# Patient Record
Sex: MALE | Race: WHITE | NOT HISPANIC OR LATINO | URBAN - METROPOLITAN AREA
[De-identification: names, ages, dates, MRNs, and addresses within clinical notes are randomized per-mention and may not be internally consistent; named-entity substitution may affect disease eponyms.]

---

## 2023-07-31 LAB
ALANINE AMINOTRANSFERASE (SGPT) (U/L) IN SER/PLAS: 34 U/L (ref 10–52)
ALBUMIN (G/DL) IN SER/PLAS: 4.7 G/DL (ref 3.4–5)
ALKALINE PHOSPHATASE (U/L) IN SER/PLAS: 43 U/L (ref 33–120)
ANION GAP IN SER/PLAS: 12 MMOL/L (ref 10–20)
ASPARTATE AMINOTRANSFERASE (SGOT) (U/L) IN SER/PLAS: 29 U/L (ref 9–39)
BASOPHILS (10*3/UL) IN BLOOD BY AUTOMATED COUNT: 0.03 X10E9/L (ref 0–0.1)
BASOPHILS/100 LEUKOCYTES IN BLOOD BY AUTOMATED COUNT: 0.6 % (ref 0–2)
BILIRUBIN TOTAL (MG/DL) IN SER/PLAS: 0.9 MG/DL (ref 0–1.2)
CALCIUM (MG/DL) IN SER/PLAS: 9.6 MG/DL (ref 8.6–10.3)
CARBON DIOXIDE, TOTAL (MMOL/L) IN SER/PLAS: 28 MMOL/L (ref 21–32)
CHLORIDE (MMOL/L) IN SER/PLAS: 101 MMOL/L (ref 98–107)
CHOLESTEROL (MG/DL) IN SER/PLAS: 168 MG/DL (ref 0–199)
CHOLESTEROL IN HDL (MG/DL) IN SER/PLAS: 38.1 MG/DL
CHOLESTEROL/HDL RATIO: 4.4
CREATININE (MG/DL) IN SER/PLAS: 0.98 MG/DL (ref 0.5–1.3)
EOSINOPHILS (10*3/UL) IN BLOOD BY AUTOMATED COUNT: 0.09 X10E9/L (ref 0–0.7)
EOSINOPHILS/100 LEUKOCYTES IN BLOOD BY AUTOMATED COUNT: 1.8 % (ref 0–6)
ERYTHROCYTE DISTRIBUTION WIDTH (RATIO) BY AUTOMATED COUNT: 12.1 % (ref 11.5–14.5)
ERYTHROCYTE MEAN CORPUSCULAR HEMOGLOBIN CONCENTRATION (G/DL) BY AUTOMATED: 33 G/DL (ref 32–36)
ERYTHROCYTE MEAN CORPUSCULAR VOLUME (FL) BY AUTOMATED COUNT: 86 FL (ref 80–100)
ERYTHROCYTES (10*6/UL) IN BLOOD BY AUTOMATED COUNT: 4.87 X10E12/L (ref 4.5–5.9)
GFR MALE: >90 ML/MIN/1.73M2
GLUCOSE (MG/DL) IN SER/PLAS: 81 MG/DL (ref 74–99)
HEMATOCRIT (%) IN BLOOD BY AUTOMATED COUNT: 41.8 % (ref 41–52)
HEMOGLOBIN (G/DL) IN BLOOD: 13.8 G/DL (ref 13.5–17.5)
IMMATURE GRANULOCYTES/100 LEUKOCYTES IN BLOOD BY AUTOMATED COUNT: 0.2 % (ref 0–0.9)
LDL: 98 MG/DL (ref 0–99)
LEUKOCYTES (10*3/UL) IN BLOOD BY AUTOMATED COUNT: 5.1 X10E9/L (ref 4.4–11.3)
LYMPHOCYTES (10*3/UL) IN BLOOD BY AUTOMATED COUNT: 2.22 X10E9/L (ref 1.2–4.8)
LYMPHOCYTES/100 LEUKOCYTES IN BLOOD BY AUTOMATED COUNT: 43.7 % (ref 13–44)
MONOCYTES (10*3/UL) IN BLOOD BY AUTOMATED COUNT: 0.47 X10E9/L (ref 0.1–1)
MONOCYTES/100 LEUKOCYTES IN BLOOD BY AUTOMATED COUNT: 9.3 % (ref 2–10)
NEUTROPHILS (10*3/UL) IN BLOOD BY AUTOMATED COUNT: 2.26 X10E9/L (ref 1.2–7.7)
NEUTROPHILS/100 LEUKOCYTES IN BLOOD BY AUTOMATED COUNT: 44.4 % (ref 40–80)
PLATELETS (10*3/UL) IN BLOOD AUTOMATED COUNT: 254 X10E9/L (ref 150–450)
POTASSIUM (MMOL/L) IN SER/PLAS: 4.1 MMOL/L (ref 3.5–5.3)
PROTEIN TOTAL: 7.2 G/DL (ref 6.4–8.2)
SODIUM (MMOL/L) IN SER/PLAS: 137 MMOL/L (ref 136–145)
TRIGLYCERIDE (MG/DL) IN SER/PLAS: 161 MG/DL (ref 0–149)
UREA NITROGEN (MG/DL) IN SER/PLAS: 19 MG/DL (ref 6–23)
VLDL: 32 MG/DL (ref 0–40)

## 2023-09-27 PROBLEM — E78.01 HETEROZYGOUS FAMILIAL HYPERCHOLESTEROLEMIA: Status: ACTIVE | Noted: 2023-09-27

## 2023-09-27 PROBLEM — E66.3 OVERWEIGHT WITH BODY MASS INDEX (BMI) OF 26 TO 26.9 IN ADULT: Status: ACTIVE | Noted: 2023-09-27

## 2023-09-27 PROBLEM — R03.0 BLOOD PRESSURE ELEVATED WITHOUT HISTORY OF HTN: Status: ACTIVE | Noted: 2023-09-27

## 2023-09-27 PROBLEM — E78.5 HYPERLIPEMIA: Status: ACTIVE | Noted: 2023-09-27

## 2023-09-27 RX ORDER — EZETIMIBE 10 MG/1
10 TABLET ORAL DAILY
COMMUNITY
Start: 2011-09-25 | End: 2024-02-05 | Stop reason: SDUPTHER

## 2023-09-27 RX ORDER — ROSUVASTATIN CALCIUM 40 MG/1
40 TABLET, COATED ORAL DAILY
COMMUNITY
Start: 2019-02-25 | End: 2024-02-05 | Stop reason: SDUPTHER

## 2023-09-27 RX ORDER — ASPIRIN 81 MG/1
81 TABLET ORAL DAILY
COMMUNITY

## 2023-10-02 ENCOUNTER — CLINICAL SUPPORT (OUTPATIENT)
Dept: GENETICS | Facility: CLINIC | Age: 38
End: 2023-10-02
Payer: OTHER GOVERNMENT

## 2023-10-02 VITALS
DIASTOLIC BLOOD PRESSURE: 82 MMHG | WEIGHT: 177.5 LBS | RESPIRATION RATE: 20 BRPM | HEART RATE: 72 BPM | TEMPERATURE: 98 F | BODY MASS INDEX: 26.9 KG/M2 | HEIGHT: 68 IN | SYSTOLIC BLOOD PRESSURE: 145 MMHG

## 2023-10-02 DIAGNOSIS — E78.01 FAMILIAL HYPERCHOLESTEROLEMIA: ICD-10-CM

## 2023-10-02 PROCEDURE — 96040 PR MEDICAL GENETICS COUNSELING EACH 30 MINUTES: CPT | Performed by: GENETIC COUNSELOR, MS

## 2023-10-02 NOTE — Clinical Note
10/03/23    Florentin Simon DO  [address]  [facility] [address]      Dear Dr. Florentin Simon DO,    I am writing to confirm that your patient, Spencer Floyd  received care in my office on 10/03/23. I have enclosed a summary of the care provided to Spencer for your reference.    Please contact me with any questions you may have regarding the visit.    Sincerely,         Ernestina Fish, [facility]  [address] [facility] [facility] [address]  [facility] [id number]    CC: No Recipients

## 2023-10-03 NOTE — PROGRESS NOTES
"Subjective   Patient ID: Spencer Floyd is a 38 y.o. male who presents for Heterozygous familial hypercholesterol (npv).  He is interested in learning about genetic testing and familial cascade testing for his two children.    Spencer notes that his father had both a clinical and genetic diagnosis of familial hypercholesterolemia, with a genetic mutation in a \"lipid receptor gene\". He is unable to retrieve these results. Because of his father's diagnosis, Spencer had childhood lipid screening, nad reports that his total cholesterol was between 400-500 at age 14. He has been on lipid lowering medication since childhood, and is currently on maximal dose rosuvastatin plus ezetimibe and his most recent LDL cholesterol this month was 98 mg/dL. He does not have a personal history of cardiovascular disease and he leads an active lifestyle without any exertional chest pain, shortness of breath, palpitations, or syncope. He is a non-smoker and does not have any other significant cardiovascular risk factors although he does have elevated blood pressure but not hypertension based on his ambulatory blood pressure cuff at home.     Family History:  A three generation pedigree was collected, and was concerning for the following:    -Father with clinical and reportedly genetic diagnosis of familial hypercholesterolemia. Had his first MI in his early 40s, and his second early 60s. He had to have extensive bypass surgery. He passed away in his 60s.  -Two paternal aunts and a paternal uncle with high cholesterol, but reportedly only started in late adulthood.  -Paternal grandfather had several MI's, unsure at what ages,  in his 60s.  -Brother  at age 22 to suicide, but reportedly had normal lipid levels.    Spencer is of [place]  descent.  Consanguinity and Ashkenazi Anabaptism ancestry was denied.    The rest of the family history was negative for intellectual disability, autism, birth defects, multiple " miscarriages, early onset cancer or kidney concerns, and other hereditary conditions.    Assessment/Plan   Genetic Test Ordered: yes    Genetic Counseling:  Familial hypercholesterolemia (FH) is a genetic condition characterized by abnormally high concentrations of low density lipoprotein cholesterol (LDL-C) in the blood since birth, which predisposes affected individuals to premature coronary heart disease (CHD), stroke, and death. In addition to extreme hypercholesterolemia, affected individuals may present with vascular-related manifestations of CHD, such as angina, myocardial infarction, peripheral vascular disease, and stroke. Physical manifestations, including xanthomas (subcutaneous fat deposits), xanthelasmas (subcutaneous fat deposits around the eyes), and corneal arcus (whitish ring around the iris), may also be observed.     Individuals with FH have a significantly increased risk for CHD, estimated to be 20 times greater than that of the general population. If untreated, men with FH have a 50% risk and women with FH have a 30% risk of having a cardiovascular event by ages 50 and 60 years, respectively. The prevalence of FH is estimated to be 1:300 to 1:500 individuals in the general population, with an even higher prevalence in certain ethnic groups.     Heterozygous FH (HeFH) is typically inherited in an autosomal dominant manner. Almost all affected individuals inherit the familial mutation from an affected parent. In addition, all children of an individual with HeFH have a 50% chance of inheriting the mutation. Homozygous FH (HoFH) is rare (prevalence 1:1,000,000) and occurs when an individual inherits two deleterious mutations. Thus, if both parents have HeFH, each of their children would have a 25% risk of inheriting HoFH and a 50% risk of inheriting HeFH.    We reviewed options for testing, specifically discussing a comprehensive lipidemia panel through [facility] that would examine 25 genes associated  with hereditary lipidemias. The risks, benefits, and limitations of genetic testing was discussed.  Possible test results, including positive, negative, and VUSs, were reviewed.  After discussion, the patient elected to proceed with panel testing.    Results should be available in 3-4 weeks and will be called out to the patient.  Should a likely pathogenic or pathogenic mutation be identified, we will be able to offer family members targeted testing.  If no mutation is identified, lipid screening for all 1st-degree relatives will be recommended.    Ernestina Fish MS [facility]  Licensed Genetic Counselor  [facility]    Reviewed by Vanessa Lux MD  Clinical   [facility]    Total time spent on day of encounter: 32 minutes

## 2023-10-10 ENCOUNTER — OFFICE VISIT (OUTPATIENT)
Dept: PRIMARY CARE | Facility: CLINIC | Age: 38
End: 2023-10-10
Payer: OTHER GOVERNMENT

## 2023-10-10 VITALS
BODY MASS INDEX: 26.64 KG/M2 | HEART RATE: 79 BPM | DIASTOLIC BLOOD PRESSURE: 66 MMHG | SYSTOLIC BLOOD PRESSURE: 132 MMHG | TEMPERATURE: 97.8 F | HEIGHT: 68 IN | WEIGHT: 175.8 LBS

## 2023-10-10 DIAGNOSIS — R07.89 ATYPICAL CHEST PAIN: Primary | ICD-10-CM

## 2023-10-10 DIAGNOSIS — R03.0 BLOOD PRESSURE ELEVATED WITHOUT HISTORY OF HTN: ICD-10-CM

## 2023-10-10 DIAGNOSIS — E78.49 FAMILIAL HYPERLIPIDEMIA: ICD-10-CM

## 2023-10-10 DIAGNOSIS — E78.01 HETEROZYGOUS FAMILIAL HYPERCHOLESTEROLEMIA: ICD-10-CM

## 2023-10-10 DIAGNOSIS — E66.3 OVERWEIGHT WITH BODY MASS INDEX (BMI) OF 26 TO 26.9 IN ADULT: ICD-10-CM

## 2023-10-10 DIAGNOSIS — R07.89 OTHER CHEST PAIN: ICD-10-CM

## 2023-10-10 DIAGNOSIS — E78.01 FAMILIAL HYPERCHOLESTEROLEMIA: ICD-10-CM

## 2023-10-10 DIAGNOSIS — R93.1 ELEVATED CORONARY ARTERY CALCIUM SCORE: ICD-10-CM

## 2023-10-10 PROCEDURE — 93000 ELECTROCARDIOGRAM COMPLETE: CPT | Performed by: INTERNAL MEDICINE

## 2023-10-10 PROCEDURE — 3008F BODY MASS INDEX DOCD: CPT | Performed by: INTERNAL MEDICINE

## 2023-10-10 PROCEDURE — 1036F TOBACCO NON-USER: CPT | Performed by: INTERNAL MEDICINE

## 2023-10-10 PROCEDURE — 99213 OFFICE O/P EST LOW 20 MIN: CPT | Performed by: INTERNAL MEDICINE

## 2023-10-10 RX ORDER — EVOLOCUMAB 140 MG/ML
140 INJECTION, SOLUTION SUBCUTANEOUS
COMMUNITY
Start: 2023-10-05

## 2023-10-10 ASSESSMENT — ENCOUNTER SYMPTOMS
CHILLS: 0
FEVER: 0

## 2023-10-10 NOTE — PROGRESS NOTES
Subjective   Patient ID: Spencer Floyd is a 38 y.o. male who presents for Follow-up (Test results).    HPI patient presents to the office today to review recent CT calcium score test.  States he did see cardiology and was started on Repatha Which he recently started as well as aspirin.  Has been making lifestyle changes as well.  States that his CT calcium score was 179 puts him in the moderately increased risk category.  Specific cardiac score showed the left main with 124.16 and LAD with 55.24.  States that last 3 to 4 days he has had some chest pain more as a sharp type of pain discomfort where the sternum meets the ribs.  No injury or trauma.  He was concerned about this pain given what he knew about the calcium score test and thinks this made him more anxious.  Inquires about what symptoms to look out for for heart attack and symptoms of coronary artery disease.  He is not having any fever, chills, shortness of breath, nausea, vomiting, sweats, pain of the jaw arm or back.  States he does exercise runs about 3 miles a day and has not had any symptoms during those episodes.  He notices this chest discomfort more at rest.  He does have elevated coronary calcium score as previously discussed and also has had genetic tests for familial hypercholesterolemia given this runs in the family.  There is a family history of early coronary artery disease.  Patient has had no lightheadedness dizziness or syncope.  He has upcoming appointment with cardiology early next month.      Review of Systems   Constitutional:  Negative for chills and fever.   HENT:  Negative for sore throat.    Respiratory:  Negative for cough and shortness of breath.    Cardiovascular:  Positive for chest pain. Negative for palpitations.   Gastrointestinal:  Negative for abdominal pain, diarrhea, nausea and vomiting.   Genitourinary:  Negative for dysuria.   Musculoskeletal:  Negative for back pain.   Skin:  Negative for rash.   Neurological:   "Negative for dizziness, syncope and light-headedness.   Psychiatric/Behavioral:  The patient is nervous/anxious.         Objective   /66 (BP Location: Right arm, Patient Position: Sitting, BP Cuff Size: Adult)   Pulse 79   Temp 36.6 °C (97.8 °F)   Ht 1.727 m (5' 8\")   Wt 79.7 kg (175 lb 12.8 oz)   BMI 26.73 kg/m²     Physical Exam  Vitals and nursing note reviewed.   Constitutional:       Appearance: Normal appearance.   HENT:      Head: Normocephalic and atraumatic.      Mouth/Throat:      Mouth: Mucous membranes are moist.      Pharynx: Oropharynx is clear. No oropharyngeal exudate.   Eyes:      Extraocular Movements: Extraocular movements intact.      Pupils: Pupils are equal, round, and reactive to light.   Cardiovascular:      Rate and Rhythm: Normal rate and regular rhythm.      Heart sounds: Normal heart sounds. No murmur heard.  Pulmonary:      Effort: Pulmonary effort is normal. No respiratory distress.      Breath sounds: Normal breath sounds. No wheezing.   Abdominal:      Palpations: Abdomen is soft. There is no mass.      Tenderness: There is no abdominal tenderness.   Musculoskeletal:      Cervical back: Neck supple.   Skin:     General: Skin is warm and dry.   Neurological:      General: No focal deficit present.      Mental Status: He is alert and oriented to person, place, and time.   Psychiatric:         Mood and Affect: Mood normal.         Behavior: Behavior normal.         Assessment/Plan   Problem List Items Addressed This Visit             ICD-10-CM    Familial hyperlipidemia E78.49    Relevant Orders    Referral to Cardiology    Blood pressure elevated without history of HTN R03.0    Heterozygous familial hypercholesterolemia E78.01    Relevant Orders    Echocardiogram Stress Test    Hyperlipemia E78.5    Relevant Orders    Referral to Cardiology    Overweight with body mass index (BMI) of 26 to 26.9 in adult E66.3, Z68.26    Relevant Orders    Echocardiogram Stress Test    Other " chest pain R07.89    Relevant Orders    Echocardiogram Stress Test    Elevated coronary artery calcium score R93.1    Relevant Orders    Echocardiogram Stress Test    Atypical chest pain - Primary R07.89    Relevant Orders    ECG 12 Lead    Referral to Cardiology     Atypical chest pain: We have ordered EKG in the office today did not show any acute injury pattern.  EKG showed sinus bradycardia with sinus arrhythmia, 50 bpm, CO interval 170 ms, QRS duration 106 ms, QTc 402 ms, incomplete right bundle branch block, normal R wave progression, nonspecific ST-T wave changes.  Given his cardiac risk factors and elevated coronary artery calcium score and family history of ordered a stress echocardiogram for further evaluation.  Suspect that his chest discomfort is more of a costochondritis based upon the description.  He may take over-the-counter ibuprofen for the next week.    Elevated coronary artery calcium score: Has appointment with cardiology to discuss.    Hyperlipidemia: Patient taking Repatha currently has had testing for familial hypercholesterolemia genetic testing that has not returned.    Blood pressure elevated without history of hypertension: We will need to monitor blood pressure carefully is making dietary and lifestyle changes

## 2023-10-13 ASSESSMENT — ENCOUNTER SYMPTOMS
SORE THROAT: 0
DIZZINESS: 0
COUGH: 0
DYSURIA: 0
PALPITATIONS: 0
BACK PAIN: 0
DIARRHEA: 0
NAUSEA: 0
SHORTNESS OF BREATH: 0
NERVOUS/ANXIOUS: 1
ABDOMINAL PAIN: 0
LIGHT-HEADEDNESS: 0
VOMITING: 0

## 2023-10-26 ENCOUNTER — TELEPHONE (OUTPATIENT)
Dept: GENETICS | Facility: CLINIC | Age: 38
End: 2023-10-26
Payer: OTHER GOVERNMENT

## 2023-10-30 ENCOUNTER — HOSPITAL ENCOUNTER (OUTPATIENT)
Dept: CARDIOLOGY | Facility: CLINIC | Age: 38
Discharge: HOME | End: 2023-10-30
Payer: OTHER GOVERNMENT

## 2023-10-30 DIAGNOSIS — R07.89 OTHER CHEST PAIN: ICD-10-CM

## 2023-10-30 DIAGNOSIS — E66.3 OVERWEIGHT WITH BODY MASS INDEX (BMI) OF 26 TO 26.9 IN ADULT: ICD-10-CM

## 2023-10-30 DIAGNOSIS — E78.01 HETEROZYGOUS FAMILIAL HYPERCHOLESTEROLEMIA: ICD-10-CM

## 2023-10-30 DIAGNOSIS — R93.1 ELEVATED CORONARY ARTERY CALCIUM SCORE: ICD-10-CM

## 2023-10-30 PROCEDURE — 93016 CV STRESS TEST SUPVJ ONLY: CPT | Performed by: INTERNAL MEDICINE

## 2023-10-30 PROCEDURE — 93350 STRESS TTE ONLY: CPT

## 2023-10-30 PROCEDURE — 93350 STRESS TTE ONLY: CPT | Performed by: INTERNAL MEDICINE

## 2023-10-30 PROCEDURE — 93018 CV STRESS TEST I&R ONLY: CPT | Performed by: INTERNAL MEDICINE

## 2023-11-06 ENCOUNTER — TELEPHONE (OUTPATIENT)
Dept: PRIMARY CARE | Facility: CLINIC | Age: 38
End: 2023-11-06
Payer: OTHER GOVERNMENT

## 2023-11-06 NOTE — TELEPHONE ENCOUNTER
----- Message from Florentin Simon DO sent at 11/3/2023  4:07 PM EDT -----  Patient's stress test was normal. Keep follow-up appointment with cardiology.

## 2023-11-09 ENCOUNTER — OFFICE VISIT (OUTPATIENT)
Dept: CARDIOLOGY | Facility: CLINIC | Age: 38
End: 2023-11-09
Payer: OTHER GOVERNMENT

## 2023-11-09 VITALS
HEIGHT: 68 IN | DIASTOLIC BLOOD PRESSURE: 90 MMHG | OXYGEN SATURATION: 99 % | WEIGHT: 173.25 LBS | SYSTOLIC BLOOD PRESSURE: 165 MMHG | BODY MASS INDEX: 26.26 KG/M2 | HEART RATE: 76 BPM

## 2023-11-09 DIAGNOSIS — R93.1 ELEVATED CORONARY ARTERY CALCIUM SCORE: ICD-10-CM

## 2023-11-09 DIAGNOSIS — R03.0 BLOOD PRESSURE ELEVATED WITHOUT HISTORY OF HTN: ICD-10-CM

## 2023-11-09 DIAGNOSIS — E78.01 HETEROZYGOUS FAMILIAL HYPERCHOLESTEROLEMIA: Primary | ICD-10-CM

## 2023-11-09 PROCEDURE — 99214 OFFICE O/P EST MOD 30 MIN: CPT | Performed by: INTERNAL MEDICINE

## 2023-11-09 PROCEDURE — 1036F TOBACCO NON-USER: CPT | Performed by: INTERNAL MEDICINE

## 2023-11-09 PROCEDURE — 3008F BODY MASS INDEX DOCD: CPT | Performed by: INTERNAL MEDICINE

## 2023-11-09 ASSESSMENT — ENCOUNTER SYMPTOMS
PSYCHIATRIC NEGATIVE: 1
ENDOCRINE NEGATIVE: 1
CONSTITUTIONAL NEGATIVE: 1
RESPIRATORY NEGATIVE: 1
ALLERGIC/IMMUNOLOGIC NEGATIVE: 1
NEUROLOGICAL NEGATIVE: 1
GASTROINTESTINAL NEGATIVE: 1
EYES NEGATIVE: 1
HEMATOLOGIC/LYMPHATIC NEGATIVE: 1
CARDIOVASCULAR NEGATIVE: 1
MUSCULOSKELETAL NEGATIVE: 1

## 2023-11-09 NOTE — PROGRESS NOTES
Preventive Cardiology Clinic Note    Reason for Visit: Follow-up visit  Referring Clinician: No ref. provider found     History of Present Illness: Mr. 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  Since his last visit we did a coronary artery calcium score came back elevated so I recommended starting Repatha for additional LDL lowering to reduce his ASCVD risk.  He is tolerating the medication well without any adverse events.  He continues on low-dose aspirin, high intensity statin, PCSK9 inhibitor, and ezetimibe.  He had an episode of chest discomfort and so was referred for a stress test which was normal without any evidence of ischemia or infarction at a high workload and excellent cardiopulmonary fitness.  He continues to work on diet and remains physically active.  He is planning on getting his children screened for the LDL receptor mutation per cascade screening recommendations.    Past Medical History:  Past medical history as above in the HPI    Past Surgical History:  He has no past surgical history on file.    Social History:  He reports that he has never smoked. He has never used smokeless tobacco.    Family History:  Family History   Problem Relation Name Age of Onset    Hyperlipidemia Father         Allergies:  Patient has no known allergies.    Outpatient Medications:  Current Outpatient Medications   Medication Instructions    aspirin 81 mg, oral, Daily    ezetimibe (ZETIA) 10 mg, oral, Daily    Repatha SureClick 140 mg, subcutaneous, Every 14 days    rosuvastatin (CRESTOR) 40 mg, oral, Daily       Review of Systems:  Review of Systems   Constitutional: Negative.   HENT: Negative.     Eyes: Negative.    Cardiovascular:  "Negative.    Respiratory: Negative.     Endocrine: Negative.    Hematologic/Lymphatic: Negative.    Skin: Negative.    Musculoskeletal: Negative.    Gastrointestinal: Negative.    Genitourinary: Negative.    Neurological: Negative.    Psychiatric/Behavioral: Negative.     Allergic/Immunologic: Negative.        Last Recorded Vitals:  Vitals:    11/09/23 1423 11/09/23 1425   BP: 177/87 165/90   BP Location: Left arm Right arm   Patient Position: Sitting Sitting   Pulse: 76    SpO2: 99%    Weight: 78.6 kg (173 lb 4 oz)    Height: 1.727 m (5' 8\")        Physical Examination:  General: Well appearing, well-nourished, in no acute distress.  HEENT: Normocephalic atraumatic, pupils equal and reactive to light, extraocular muscles intact, no conjunctival injection, oropharynx clear without exudates.  Neck: Normal carotid arterial pulses, no arterial bruits, no thyromegaly.  Cardiac: Regular rhythm and normal heart rate.  S1, S2 present and normal.  No murmurs, rubs, or gallops.  PMI is nondisplaced. Jugular venous pulsations are normal.  Pulmonary: Normal breath sounds, no increased work of breathing, no wheezes or crackles.  GI: Normal bowel sounds, abdominal aorta not enlarged, no hepatosplenomegaly, no abdominal bruits.  Lower extremities: No cyanosis, clubbing, or edema.  No xanthelasma present. Normal distal pulses.  Skin: Skin intact. No significant rashes or lesions present.  Neuro: Alert and oriented x 3, normal attention and cognition, no focal motor or sensory neurologic deficits.  Psych: Normal affect and mood.  Musculoskeletal: Normal gait normal muscle tone.    Laboratory Studies:  Lab Results   Component Value Date    GLUCOSE 81 07/31/2023    CALCIUM 9.6 07/31/2023     07/31/2023    K 4.1 07/31/2023    CO2 28 07/31/2023     07/31/2023    BUN 19 07/31/2023    CREATININE 0.98 07/31/2023     Lab Results   Component Value Date    ALT 34 07/31/2023    AST 29 07/31/2023    ALKPHOS 43 07/31/2023    BILITOT " "0.9 07/31/2023         Lab Results   Component Value Date    CHOL 168 07/31/2023     Lab Results   Component Value Date    HDL 38.1 (A) 07/31/2023     No results found for: \"LDLCALC\"  Lab Results   Component Value Date    TRIG 161 (H) 07/31/2023       Cardiology Tests:  Stress test 10/10/2023   1. The resting ejection fraction was estimated at 65% with a peak exercise ejection fraction estimated at 75%.   2. PEAK HR= 167 which is 91% of APMHR.   3. PEAK BP= 176/75.   4. PEAK METS Achieved= 15.30.   5. Functional Capacity= Above Average.   6. Adequate level of stress achieved.   7. No clinical, echocardiographic or electrocardiographic evidence for ischemia at a maximal workload.   8. Normal Stress Test.    Coronary artery calcium score 9/28/2003  179 AU    Assessment and Plan:  Problem List Items Addressed This Visit          Cardiac and Vasculature    Blood pressure elevated without history of HTN    Relevant Orders    Comprehensive Metabolic Panel    Heterozygous familial hypercholesterolemia - Primary    Relevant Orders    Lipid Panel    Comprehensive Metabolic Panel    Lipoprotein a    Elevated coronary artery calcium score     Mr. 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  We discussed his high ASCVD risk due to heterozygous FH and a plan to continue his current medical therapy to reduce this risk.  I have ordered repeat laboratory studies to include a lipid panel, LP(a) level, and comprehensive chemistry that he will complete at the end of 3 months of Repatha therapy.  We also went over his blood pressure cuff in the office and in the ambulatory setting he is consistently less than 120/80 mmHg although in the office today both " on his cuff and our cuff his systolic blood pressure is elevated consistent with whitecoat effect.  I would not recommend any pharmacologic therapy with a whitecoat effect at this time and have discussed with him nonpharmacologic means for blood pressure lowering.  I agree with getting his children tested for genetic LDL receptor mutation for early intervention if positive.  Once his lab tests are completed I will call him with results to go over any further recommendations/medication adjustments.  I will see him again in 1 year here in the office routine follow-up.  Please do not hesitate to call or contact me with questions or concerns.    Elie Villagran MD, FAHA, FACC  Director,  [facility]  Director,  [facility] Program  Associate Professor of Medicine  [employer] [employer] [facility] [employer]

## 2023-12-21 ENCOUNTER — LAB (OUTPATIENT)
Dept: LAB | Facility: LAB | Age: 38
End: 2023-12-21
Payer: OTHER GOVERNMENT

## 2023-12-21 DIAGNOSIS — E78.01 HETEROZYGOUS FAMILIAL HYPERCHOLESTEROLEMIA: ICD-10-CM

## 2023-12-21 DIAGNOSIS — R03.0 BLOOD PRESSURE ELEVATED WITHOUT HISTORY OF HTN: ICD-10-CM

## 2023-12-21 LAB
ALBUMIN SERPL BCP-MCNC: 4.7 G/DL (ref 3.4–5)
ALP SERPL-CCNC: 54 U/L (ref 33–120)
ALT SERPL W P-5'-P-CCNC: 42 U/L (ref 10–52)
ANION GAP SERPL CALC-SCNC: 11 MMOL/L (ref 10–20)
AST SERPL W P-5'-P-CCNC: 33 U/L (ref 9–39)
BILIRUB SERPL-MCNC: 0.8 MG/DL (ref 0–1.2)
BUN SERPL-MCNC: 14 MG/DL (ref 6–23)
CALCIUM SERPL-MCNC: 9.5 MG/DL (ref 8.6–10.3)
CHLORIDE SERPL-SCNC: 105 MMOL/L (ref 98–107)
CO2 SERPL-SCNC: 28 MMOL/L (ref 21–32)
CREAT SERPL-MCNC: 0.82 MG/DL (ref 0.5–1.3)
GFR SERPL CREATININE-BSD FRML MDRD: >90 ML/MIN/1.73M*2
GLUCOSE SERPL-MCNC: 89 MG/DL (ref 74–99)
POTASSIUM SERPL-SCNC: 4.3 MMOL/L (ref 3.5–5.3)
PROT SERPL-MCNC: 6.9 G/DL (ref 6.4–8.2)
SODIUM SERPL-SCNC: 140 MMOL/L (ref 136–145)

## 2023-12-21 PROCEDURE — 36415 COLL VENOUS BLD VENIPUNCTURE: CPT

## 2023-12-21 PROCEDURE — 80053 COMPREHEN METABOLIC PANEL: CPT

## 2023-12-21 PROCEDURE — 82172 ASSAY OF APOLIPOPROTEIN: CPT

## 2023-12-23 LAB — LPA SERPL-MCNC: <6 MG/DL

## 2023-12-27 ENCOUNTER — PATIENT MESSAGE (OUTPATIENT)
Dept: CARDIOLOGY | Facility: CLINIC | Age: 38
End: 2023-12-27
Payer: OTHER GOVERNMENT

## 2023-12-27 DIAGNOSIS — E78.01 HETEROZYGOUS FAMILIAL HYPERCHOLESTEROLEMIA: Primary | ICD-10-CM

## 2024-01-04 ENCOUNTER — LAB (OUTPATIENT)
Dept: LAB | Facility: LAB | Age: 39
End: 2024-01-04
Payer: OTHER GOVERNMENT

## 2024-01-04 DIAGNOSIS — E78.01 HETEROZYGOUS FAMILIAL HYPERCHOLESTEROLEMIA: ICD-10-CM

## 2024-01-04 LAB
CHOLEST SERPL-MCNC: 62 MG/DL (ref 0–199)
CHOLESTEROL/HDL RATIO: 1.5
HDLC SERPL-MCNC: 41.3 MG/DL
LDLC SERPL CALC-MCNC: 7 MG/DL
NON HDL CHOLESTEROL: 21 MG/DL (ref 0–149)
TRIGL SERPL-MCNC: 71 MG/DL (ref 0–149)
VLDL: 14 MG/DL (ref 0–40)

## 2024-01-04 PROCEDURE — 36415 COLL VENOUS BLD VENIPUNCTURE: CPT

## 2024-01-04 PROCEDURE — 80061 LIPID PANEL: CPT

## 2024-02-05 DIAGNOSIS — E78.01 HETEROZYGOUS FAMILIAL HYPERCHOLESTEROLEMIA: ICD-10-CM

## 2024-02-05 DIAGNOSIS — E78.2 MIXED HYPERLIPIDEMIA: ICD-10-CM

## 2024-02-07 RX ORDER — EZETIMIBE 10 MG/1
10 TABLET ORAL DAILY
Qty: 90 TABLET | Refills: 3 | Status: SHIPPED | OUTPATIENT
Start: 2024-02-07

## 2024-02-07 RX ORDER — ROSUVASTATIN CALCIUM 40 MG/1
40 TABLET, COATED ORAL DAILY
Qty: 90 TABLET | Refills: 3 | Status: SHIPPED | OUTPATIENT
Start: 2024-02-07

## 2024-08-27 DIAGNOSIS — E78.01 HETEROZYGOUS FAMILIAL HYPERCHOLESTEROLEMIA: Primary | ICD-10-CM

## 2024-08-27 RX ORDER — EVOLOCUMAB 140 MG/ML
INJECTION, SOLUTION SUBCUTANEOUS
Qty: 90 EACH | Refills: 0 | Status: SHIPPED | OUTPATIENT
Start: 2024-08-27 | End: 2024-11-25

## 2024-10-25 ENCOUNTER — LAB (OUTPATIENT)
Dept: LAB | Facility: LAB | Age: 39
End: 2024-10-25
Payer: OTHER GOVERNMENT

## 2024-10-25 DIAGNOSIS — E78.01 HETEROZYGOUS FAMILIAL HYPERCHOLESTEROLEMIA: ICD-10-CM

## 2024-10-25 LAB
CHOLEST SERPL-MCNC: 64 MG/DL (ref 0–199)
CHOLESTEROL/HDL RATIO: 1.6
HDLC SERPL-MCNC: 40.6 MG/DL
LDLC SERPL CALC-MCNC: 4 MG/DL
NON HDL CHOLESTEROL: 23 MG/DL (ref 0–149)
TRIGL SERPL-MCNC: 96 MG/DL (ref 0–149)
VLDL: 19 MG/DL (ref 0–40)

## 2024-10-25 PROCEDURE — 36415 COLL VENOUS BLD VENIPUNCTURE: CPT

## 2024-10-25 PROCEDURE — 80061 LIPID PANEL: CPT

## 2024-11-21 ENCOUNTER — HOSPITAL ENCOUNTER (OUTPATIENT)
Dept: RADIOLOGY | Facility: CLINIC | Age: 39
Discharge: HOME | End: 2024-11-21
Payer: OTHER GOVERNMENT

## 2024-11-21 ENCOUNTER — APPOINTMENT (OUTPATIENT)
Dept: PRIMARY CARE | Facility: CLINIC | Age: 39
End: 2024-11-21
Payer: OTHER GOVERNMENT

## 2024-11-21 VITALS
WEIGHT: 167.4 LBS | SYSTOLIC BLOOD PRESSURE: 149 MMHG | BODY MASS INDEX: 25.37 KG/M2 | TEMPERATURE: 98.1 F | HEIGHT: 68 IN | HEART RATE: 68 BPM | DIASTOLIC BLOOD PRESSURE: 82 MMHG

## 2024-11-21 DIAGNOSIS — G89.29 CHRONIC PAIN OF LEFT KNEE: ICD-10-CM

## 2024-11-21 DIAGNOSIS — M25.562 CHRONIC PAIN OF LEFT KNEE: Primary | ICD-10-CM

## 2024-11-21 DIAGNOSIS — G89.29 CHRONIC PAIN OF LEFT KNEE: Primary | ICD-10-CM

## 2024-11-21 DIAGNOSIS — M25.562 CHRONIC PAIN OF LEFT KNEE: ICD-10-CM

## 2024-11-21 PROCEDURE — 73560 X-RAY EXAM OF KNEE 1 OR 2: CPT | Mod: LT

## 2024-11-21 PROCEDURE — 73560 X-RAY EXAM OF KNEE 1 OR 2: CPT | Mod: LEFT SIDE | Performed by: RADIOLOGY

## 2024-11-21 PROCEDURE — 99213 OFFICE O/P EST LOW 20 MIN: CPT | Performed by: INTERNAL MEDICINE

## 2024-11-21 PROCEDURE — 3008F BODY MASS INDEX DOCD: CPT | Performed by: INTERNAL MEDICINE

## 2024-11-21 ASSESSMENT — PATIENT HEALTH QUESTIONNAIRE - PHQ9
SUM OF ALL RESPONSES TO PHQ9 QUESTIONS 1 AND 2: 0
1. LITTLE INTEREST OR PLEASURE IN DOING THINGS: NOT AT ALL
2. FEELING DOWN, DEPRESSED OR HOPELESS: NOT AT ALL

## 2024-11-21 NOTE — PROGRESS NOTES
"Subjective   Patient ID: Spencer Floyd is a 39 y.o. male who presents for Knee Pain (Left knee soreness, achiness.  Sometimes his knee gives else.  This has been going on for several months and just not getting better.).    HPI Having left knee issues for 2 months. Denies injury or trauma. The pain is mostly lateral aspect of the knee and to some degree the medial aspect. The pain is worse with bending the knee. The pain is an aching sensation. Putting weight on the knee makes it worse especially at 90 degrees makes it worse. Denies redness or swelling. Patient has tried 3M knee braces that helps.  The pain patient has is achy, constant, worse with activity and better with rest.        Review of Systems   Constitutional:  Negative for chills and fever.   Eyes:  Negative for visual disturbance.   Respiratory:  Negative for cough and shortness of breath.    Cardiovascular:  Negative for chest pain and leg swelling.   Gastrointestinal:  Negative for abdominal pain, nausea and vomiting.   Musculoskeletal:  Positive for arthralgias (knee pain).   Skin:  Negative for rash.   Neurological:  Negative for dizziness and light-headedness.       Objective   /82 (BP Location: Left arm, Patient Position: Sitting, BP Cuff Size: Adult)   Pulse 68   Temp 36.7 °C (98.1 °F)   Ht 1.727 m (5' 8\")   Wt 75.9 kg (167 lb 6.4 oz)   BMI 25.45 kg/m²     Physical Exam  Vitals and nursing note reviewed.   Constitutional:       General: He is not in acute distress.     Appearance: Normal appearance. He is not ill-appearing, toxic-appearing or diaphoretic.   HENT:      Head: Normocephalic and atraumatic.      Mouth/Throat:      Mouth: Mucous membranes are moist.   Cardiovascular:      Rate and Rhythm: Normal rate and regular rhythm.   Pulmonary:      Effort: Pulmonary effort is normal.      Breath sounds: Normal breath sounds.   Musculoskeletal:         General: Tenderness (tenderness along medial joint line) present. No deformity. " Normal range of motion.      Right lower leg: No edema.      Left lower leg: No edema.      Comments: Negative anterior and posterior draw tests.    Skin:     General: Skin is warm and dry.      Coloration: Skin is not jaundiced or pale.      Findings: No rash.   Neurological:      General: No focal deficit present.      Mental Status: He is alert and oriented to person, place, and time.      Cranial Nerves: No cranial nerve deficit.   Psychiatric:         Mood and Affect: Mood normal.         Behavior: Behavior normal.         Thought Content: Thought content normal.         Judgment: Judgment normal.         Assessment/Plan   Problem List Items Addressed This Visit             ICD-10-CM    Chronic pain of left knee - Primary M25.562, G89.29    Relevant Orders    XR knee left 1-2 views (Completed)     Chronic pain of left knee: Patient has not had any imaging to the knee. Because of the chronic nature of his pain we will start with an x-ray.  This may be an internal derangement of the knee contributing to his symptoms and may ultimately require more advanced imaging such as MRI.  In the meantime he can continue to use the brace for his knee over-the-counter NSAIDs such as ibuprofen, rest, ice, elevation of the knee and leg.  If x-ray imaging is negative we will consider having him see orthopedics for further evaluation especially if the pain and symptoms persist.

## 2024-12-23 DIAGNOSIS — G89.29 CHRONIC PAIN OF LEFT KNEE: Primary | ICD-10-CM

## 2024-12-23 DIAGNOSIS — M25.562 CHRONIC PAIN OF LEFT KNEE: Primary | ICD-10-CM

## 2024-12-23 DIAGNOSIS — M25.362 KNEE GIVES OUT, LEFT: ICD-10-CM

## 2025-01-13 ENCOUNTER — APPOINTMENT (OUTPATIENT)
Dept: ORTHOPEDIC SURGERY | Facility: CLINIC | Age: 40
End: 2025-01-13
Payer: OTHER GOVERNMENT

## 2025-01-27 ENCOUNTER — OFFICE VISIT (OUTPATIENT)
Dept: ORTHOPEDIC SURGERY | Facility: CLINIC | Age: 40
End: 2025-01-27
Payer: OTHER GOVERNMENT

## 2025-01-27 DIAGNOSIS — G89.29 CHRONIC PAIN OF LEFT KNEE: ICD-10-CM

## 2025-01-27 DIAGNOSIS — M25.562 CHRONIC PAIN OF LEFT KNEE: ICD-10-CM

## 2025-01-27 DIAGNOSIS — M25.362 KNEE GIVES OUT, LEFT: ICD-10-CM

## 2025-01-27 DIAGNOSIS — S83.242A ACUTE MEDIAL MENISCUS TEAR OF LEFT KNEE, INITIAL ENCOUNTER: ICD-10-CM

## 2025-01-27 PROCEDURE — 99213 OFFICE O/P EST LOW 20 MIN: CPT | Performed by: ORTHOPAEDIC SURGERY

## 2025-01-27 PROCEDURE — 99204 OFFICE O/P NEW MOD 45 MIN: CPT | Performed by: ORTHOPAEDIC SURGERY

## 2025-01-27 ASSESSMENT — PAIN - FUNCTIONAL ASSESSMENT: PAIN_FUNCTIONAL_ASSESSMENT: NO/DENIES PAIN

## 2025-01-27 NOTE — PROGRESS NOTES
History of Present Illness:   The patient presents today endorsing left knee pain. The pain localizes over the medial joint line.  The patient denies any recent trauma and notes the insidious onset of pain.  The pain is achy, constant, worse with activity and better with rest. The patient notes occasional locking, catching and giving out.  The patient has tried the following modalities: Rest, ice, anti-inflammatories patient notes a very specific arc of flexion with rotation that causes mechanical symptoms of buckling.  This is relatively new to him over the past several months without known injury.  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        No past medical history on file.  No past surgical history on file.    Current Outpatient Medications:     aspirin 81 mg EC tablet, Take 1 tablet (81 mg) by mouth once daily., Disp: , Rfl:     ezetimibe (Zetia) 10 mg tablet, Take 1 tablet (10 mg) by mouth once daily., Disp: 90 tablet, Rfl: 3    Repatha SureClick 140 mg/mL injection, INJECT 140 MG OTHER INJECT ONE PEN SUBCUTANEOUSLY EVERY 2 WEEKS, Disp: 90 each, Rfl: 0    rosuvastatin (Crestor) 40 mg tablet, Take 1 tablet (40 mg) by mouth once daily., Disp: 90 tablet, Rfl: 3    Review of Systems   GENERAL: Negative for malaise, significant weight loss, fever  MUSCULOSKELETAL: See HPI  NEURO:  Negative for numbness / tingling     Physical Examination:  Left Knee:  Skin healthy and intact  No gross swelling or ecchymosis  No significant varus or valgus malalignment  Effusion: absent    ROM:  Full flexion   Full extension  No pain with internal rotation of the hip  Tenderness to palpation:  medial joint line   No laxity to valgus stress  No laxity to varus stress  Negative Lachman´s test  Negative anterior drawer test  Negative posterior drawer test  Positive Lee´s test  Neurovascular exam normal distally    Imaging:  Plain films reveal no acute  bony abnormality, well-preserved joint spaces.    Assessment:    Patient with left knee pain concern for meniscal tear with likely flipped/mobile piece    Plan:  We discussed with the patient concern for a meniscal tear.   We reviewed the natural history of meniscal pathology and discussed the implications for the health of the joint.  Various treatment options were discussed and the patient elected for MRI for further evaluation, anti-inflammatories as needed, knee bracing as needed.  We will follow-up with results of MRI and discuss treatment options.    Oracio Barnett PA-C     In a face to face encounter, I evaluated the patient and performed a physical examination, discussed pertinent diagnostic studies if indicated and discussed diagnosis and management strategies with both the patient and physician assistant / nurse practitioner.  I reviewed the PA/NP's note and agree with the documented findings and plan of care.    Patient with persistent and reproducible instability in his knee.  We discussed concern for either a displacing piece of meniscus/bucket-handle tear.  However he does also have some increased laxity of the patella on that side and may represent a subluxation based on occurring with flexion.  MRI for further evaluation.    Bassam Hargrove MD

## 2025-02-06 ENCOUNTER — HOSPITAL ENCOUNTER (OUTPATIENT)
Dept: RADIOLOGY | Facility: CLINIC | Age: 40
Discharge: HOME | End: 2025-02-06
Payer: OTHER GOVERNMENT

## 2025-02-06 DIAGNOSIS — S83.242A ACUTE MEDIAL MENISCUS TEAR OF LEFT KNEE, INITIAL ENCOUNTER: ICD-10-CM

## 2025-02-06 PROCEDURE — 73721 MRI JNT OF LWR EXTRE W/O DYE: CPT | Mod: LT

## 2025-02-19 DIAGNOSIS — E78.2 MIXED HYPERLIPIDEMIA: ICD-10-CM

## 2025-02-23 DIAGNOSIS — E78.2 MIXED HYPERLIPIDEMIA: ICD-10-CM

## 2025-02-23 RX ORDER — ROSUVASTATIN CALCIUM 40 MG/1
40 TABLET, COATED ORAL DAILY
Qty: 90 TABLET | Refills: 3 | Status: SHIPPED | OUTPATIENT
Start: 2025-02-23

## 2025-02-23 RX ORDER — EZETIMIBE 10 MG/1
10 TABLET ORAL DAILY
Qty: 90 TABLET | Refills: 3 | Status: SHIPPED | OUTPATIENT
Start: 2025-02-23

## 2025-03-05 ENCOUNTER — OFFICE VISIT (OUTPATIENT)
Dept: ORTHOPEDIC SURGERY | Facility: CLINIC | Age: 40
End: 2025-03-05
Payer: OTHER GOVERNMENT

## 2025-03-05 DIAGNOSIS — M25.362 KNEE GIVES OUT, LEFT: Primary | ICD-10-CM

## 2025-03-05 PROBLEM — S83.241A OTHER TEAR OF MEDIAL MENISCUS, CURRENT INJURY, RIGHT KNEE, INITIAL ENCOUNTER: Status: ACTIVE | Noted: 2025-03-05

## 2025-03-05 PROCEDURE — 99214 OFFICE O/P EST MOD 30 MIN: CPT | Performed by: ORTHOPAEDIC SURGERY

## 2025-03-05 PROCEDURE — 99214 OFFICE O/P EST MOD 30 MIN: CPT | Mod: 57 | Performed by: ORTHOPAEDIC SURGERY

## 2025-03-05 NOTE — PROGRESS NOTES
History of Present Illness:   Patient returns today to review MRI.  The patient endorses persistent knee pain and persistent mechanical symptoms including locking, catching, and giving out.  These issues are refractory to multiple non-surgical treatments.    The patient predominantly notes mechanical symptoms with rapid flexion extension of the knee with motion such as running and squatting.  Most of his pain is medial when the pain does occur however he has difficulty ascertaining specific locations as the symptoms are sporadic in nature and relatively unpredictable.    He has tried rest, ice, anti-inflammatory as well as home exercises.     Review of Systems   GENERAL: Negative for malaise, significant weight loss, fever  MUSCULOSKELETAL: See HPI  NEURO:  Negative for numbness / tingling     Physical Examination:  Left Knee:  Skin healthy and intact  No gross varus or valgus alignment   Range of motion: no major deficits   Tenderness to palpation over joint line: medial joint line  No laxity to valgus stress  No laxity to varus stress  Negative Lachman´s test  Negative anterior drawer test  Negative posterior drawer test  Positive Lee´s test  Neurovascular exam normal distally    Imaging  MRI: Small full-thickness chondral fissuring in the lateral trochlear  groove with moderate subchondral reactive edema. Superimposed bone  contusion in this region may be present; correlate with history of  direct trauma.      Medial meniscus inner margin fraying.    Assessment:    Patient with a left knee medial meniscal tear with trochlear cartilage wear    Plan:  We discussed arthroscopy versus nonsurgical treatment for the patient´s meniscal tear.  We reviewed the blood supply to the meniscus, limited healing potential and meniscectomy versus meniscal repair.  The risks of the procedure were mentioned, including wound issues, deep venous thrombosis, pulmonary embolism and medical complications.  The anticipated  timeframe for recovery and return to activity were outlined.  We discussed formal physical therapy versus home exercise program.    We mentioned the possibility of incomplete relief of pain, particularly if any chondral defects are encountered and the possibility of arthrosis of the knee related to long-term deficiencies of the meniscus.     Oracio Barnett PA-C         In a face to face encounter, I evaluated the patient and performed a physical examination, discussed pertinent diagnostic studies if indicated and discussed diagnosis and management strategies with both the patient and physician assistant / nurse practitioner.  I reviewed the PA/NP's note and agree with the documented findings and plan of care.    Patient with persistent pain with deep squatting and bending.  He does have a small defect in the trochlea with some bony edema but also small meniscal tear.  We discussed meniscectomy chondroplasty.  Patient was amenable to proceeding    Bassam Hargrove MD

## 2025-03-08 DIAGNOSIS — S83.241A OTHER TEAR OF MEDIAL MENISCUS, CURRENT INJURY, RIGHT KNEE, INITIAL ENCOUNTER: ICD-10-CM

## 2025-03-21 ENCOUNTER — TELEPHONE (OUTPATIENT)
Dept: ORTHOPEDIC SURGERY | Facility: CLINIC | Age: 40
End: 2025-03-21
Payer: OTHER GOVERNMENT

## 2025-03-27 ENCOUNTER — LAB (OUTPATIENT)
Dept: LAB | Facility: HOSPITAL | Age: 40
End: 2025-03-27
Payer: OTHER GOVERNMENT

## 2025-03-27 ENCOUNTER — APPOINTMENT (OUTPATIENT)
Dept: ORTHOPEDIC SURGERY | Facility: CLINIC | Age: 40
End: 2025-03-27
Payer: OTHER GOVERNMENT

## 2025-03-27 DIAGNOSIS — S83.241A OTHER TEAR OF MEDIAL MENISCUS, CURRENT INJURY, RIGHT KNEE, INITIAL ENCOUNTER: Primary | ICD-10-CM

## 2025-03-27 LAB
ALBUMIN SERPL BCP-MCNC: 4.8 G/DL (ref 3.4–5)
ALP SERPL-CCNC: 58 U/L (ref 33–120)
ALT SERPL W P-5'-P-CCNC: 35 U/L (ref 10–52)
ANION GAP SERPL CALC-SCNC: 11 MMOL/L (ref 10–20)
APTT PPP: 36 SECONDS (ref 26–36)
AST SERPL W P-5'-P-CCNC: 31 U/L (ref 9–39)
BASOPHILS # BLD AUTO: 0.04 X10*3/UL (ref 0–0.1)
BASOPHILS NFR BLD AUTO: 0.9 %
BILIRUB SERPL-MCNC: 0.6 MG/DL (ref 0–1.2)
BUN SERPL-MCNC: 13 MG/DL (ref 6–23)
CALCIUM SERPL-MCNC: 9.6 MG/DL (ref 8.6–10.3)
CHLORIDE SERPL-SCNC: 104 MMOL/L (ref 98–107)
CO2 SERPL-SCNC: 29 MMOL/L (ref 21–32)
CREAT SERPL-MCNC: 0.84 MG/DL (ref 0.5–1.3)
EGFRCR SERPLBLD CKD-EPI 2021: >90 ML/MIN/1.73M*2
EOSINOPHIL # BLD AUTO: 0.08 X10*3/UL (ref 0–0.7)
EOSINOPHIL NFR BLD AUTO: 1.9 %
ERYTHROCYTE [DISTWIDTH] IN BLOOD BY AUTOMATED COUNT: 12 % (ref 11.5–14.5)
GLUCOSE SERPL-MCNC: 101 MG/DL (ref 74–99)
HCT VFR BLD AUTO: 43.2 % (ref 41–52)
HGB BLD-MCNC: 14.1 G/DL (ref 13.5–17.5)
IMM GRANULOCYTES # BLD AUTO: 0.01 X10*3/UL (ref 0–0.7)
IMM GRANULOCYTES NFR BLD AUTO: 0.2 % (ref 0–0.9)
INR PPP: 1 (ref 0.9–1.1)
LYMPHOCYTES # BLD AUTO: 1.62 X10*3/UL (ref 1.2–4.8)
LYMPHOCYTES NFR BLD AUTO: 37.7 %
MCH RBC QN AUTO: 29 PG (ref 26–34)
MCHC RBC AUTO-ENTMCNC: 32.6 G/DL (ref 32–36)
MCV RBC AUTO: 89 FL (ref 80–100)
MONOCYTES # BLD AUTO: 0.36 X10*3/UL (ref 0.1–1)
MONOCYTES NFR BLD AUTO: 8.4 %
NEUTROPHILS # BLD AUTO: 2.19 X10*3/UL (ref 1.2–7.7)
NEUTROPHILS NFR BLD AUTO: 50.9 %
NRBC BLD-RTO: 0 /100 WBCS (ref 0–0)
PLATELET # BLD AUTO: 259 X10*3/UL (ref 150–450)
POTASSIUM SERPL-SCNC: 4.2 MMOL/L (ref 3.5–5.3)
PROT SERPL-MCNC: 7.1 G/DL (ref 6.4–8.2)
PROTHROMBIN TIME: 11.1 SECONDS (ref 9.8–12.4)
RBC # BLD AUTO: 4.87 X10*6/UL (ref 4.5–5.9)
SODIUM SERPL-SCNC: 140 MMOL/L (ref 136–145)
WBC # BLD AUTO: 4.3 X10*3/UL (ref 4.4–11.3)

## 2025-03-27 PROCEDURE — 80053 COMPREHEN METABOLIC PANEL: CPT

## 2025-03-27 PROCEDURE — 85025 COMPLETE CBC W/AUTO DIFF WBC: CPT

## 2025-03-29 ENCOUNTER — OFFICE VISIT (OUTPATIENT)
Dept: URGENT CARE | Age: 40
End: 2025-03-29
Payer: OTHER GOVERNMENT

## 2025-03-29 VITALS
HEIGHT: 68 IN | SYSTOLIC BLOOD PRESSURE: 136 MMHG | BODY MASS INDEX: 25.31 KG/M2 | DIASTOLIC BLOOD PRESSURE: 81 MMHG | OXYGEN SATURATION: 99 % | HEART RATE: 76 BPM | TEMPERATURE: 97.9 F | RESPIRATION RATE: 15 BRPM | WEIGHT: 167 LBS

## 2025-03-29 DIAGNOSIS — H66.001 NON-RECURRENT ACUTE SUPPURATIVE OTITIS MEDIA OF RIGHT EAR WITHOUT SPONTANEOUS RUPTURE OF TYMPANIC MEMBRANE: Primary | ICD-10-CM

## 2025-03-29 LAB — POC RAPID STREP: NEGATIVE

## 2025-03-29 RX ORDER — AMOXICILLIN AND CLAVULANATE POTASSIUM 875; 125 MG/1; MG/1
875 TABLET, FILM COATED ORAL 2 TIMES DAILY
Qty: 20 TABLET | Refills: 0 | Status: SHIPPED | OUTPATIENT
Start: 2025-03-29

## 2025-03-29 ASSESSMENT — ENCOUNTER SYMPTOMS
HEADACHES: 0
RESPIRATORY NEGATIVE: 1
NAUSEA: 0
CHILLS: 0
WHEEZING: 0
SINUS PRESSURE: 0
DIARRHEA: 0
SHORTNESS OF BREATH: 0
COUGH: 0
RHINORRHEA: 1
CARDIOVASCULAR NEGATIVE: 1
NEUROLOGICAL NEGATIVE: 1
CONSTITUTIONAL NEGATIVE: 1
ABDOMINAL PAIN: 0
FEVER: 0
GASTROINTESTINAL NEGATIVE: 1
SORE THROAT: 1
VOMITING: 0

## 2025-03-29 NOTE — PROGRESS NOTES
"Subjective   Patient ID: Spencer Floyd \"Jocelyn" is a 40 y.o. male. They present today with a chief complaint of Earache and Sore Throat (2 days).    History of Present Illness  Ear Pain  Patient complains of ear pain and possible ear infection. Symptoms include bilateral ear pain and sore throat. Onset of symptoms was 2 days ago, and have been unchanged since that time. Associated symptoms include: none. Patient denies: chills, fever , and productive cough. He is drinking moderate amounts of fluids.          History provided by:  Patient and medical records  Earache   Associated symptoms include rhinorrhea and a sore throat. Pertinent negatives include no abdominal pain, coughing, diarrhea, headaches or vomiting.   Sore Throat   Associated symptoms include congestion and ear pain. Pertinent negatives include no abdominal pain, coughing, diarrhea, headaches, shortness of breath or vomiting.       Past Medical History  Allergies as of 03/29/2025    (No Known Allergies)       (Not in a hospital admission)       History reviewed. No pertinent past medical history.    History reviewed. No pertinent surgical history.     reports that he has never smoked. He has never used smokeless tobacco. He reports current alcohol use of about 10.0 standard drinks of alcohol per week. He reports that he does not use drugs.    Review of Systems  Review of Systems   Constitutional: Negative.  Negative for chills and fever.   HENT:  Positive for congestion, ear pain, rhinorrhea and sore throat. Negative for sinus pressure.    Respiratory: Negative.  Negative for cough, shortness of breath and wheezing.    Cardiovascular: Negative.  Negative for chest pain.   Gastrointestinal: Negative.  Negative for abdominal pain, diarrhea, nausea and vomiting.   Genitourinary: Negative.    Neurological: Negative.  Negative for headaches.   All other systems reviewed and are negative.                                 Objective    Vitals:    03/29/25 1123 " "  BP: 136/81   Pulse: 76   Resp: 15   Temp: 36.6 °C (97.9 °F)   TempSrc: Oral   SpO2: 99%   Weight: 75.8 kg (167 lb)   Height: 1.727 m (5' 8\")     No LMP for male patient.    Physical Exam  Vitals and nursing note reviewed.   Constitutional:       General: He is not in acute distress.     Appearance: Normal appearance. He is ill-appearing.   HENT:      Head: Normocephalic and atraumatic.      Right Ear: A middle ear effusion is present. Tympanic membrane is erythematous.      Left Ear: A middle ear effusion is present.      Nose: Congestion and rhinorrhea present. No mucosal edema. Rhinorrhea is clear.      Right Turbinates: Swollen.      Left Turbinates: Swollen.      Mouth/Throat:      Lips: Pink.      Mouth: Mucous membranes are moist.      Pharynx: Oropharynx is clear. Uvula midline. Postnasal drip present. No posterior oropharyngeal erythema.   Cardiovascular:      Rate and Rhythm: Normal rate and regular rhythm.      Pulses: Normal pulses.      Heart sounds: Normal heart sounds.   Pulmonary:      Effort: Pulmonary effort is normal.      Breath sounds: Normal breath sounds and air entry. No decreased breath sounds or wheezing.   Musculoskeletal:      Cervical back: Normal range of motion and neck supple.   Skin:     General: Skin is warm and dry.      Capillary Refill: Capillary refill takes less than 2 seconds.   Neurological:      Mental Status: He is alert and oriented to person, place, and time.   Psychiatric:         Behavior: Behavior normal.         Procedures    Point of Care Test & Imaging Results from this visit  Results for orders placed or performed in visit on 03/29/25   POCT rapid strep A manually resulted   Result Value Ref Range    POC Rapid Strep Negative Negative      Imaging  No results found.    Cardiology, Vascular, and Other Imaging  No other imaging results found for the past 2 days      Diagnostic study results (if any) were reviewed by CATHERINE George.    Assessment/Plan "   Allergies, medications, history, and pertinent labs/EKGs/Imaging reviewed by NANNETTE George-CNP.     Medical Decision Making  Risks, benefits, and alternatives of the medications and treatment plan prescribed today were discussed, and patient expressed understanding. Plan follow up as discussed or as needed if any worsening symptoms or change in condition. Reinforced red flags including (but not limited to): severe or worsening pain; difficulty swallowing; stiff neck; shortness of breath; coughing or vomiting blood; chest pain; and new or increased fever are indications to go to the Emergency Department.  At time of discharge patient was clinically well-appearing and HDS for outpatient management. The patient and/or family was educated regarding diagnosis, supportive care, OTC and Rx medications. The patient and/or family was given the opportunity to ask questions prior to discharge.  They verbalized understanding of my discussion of the plans for treatment, expected course, indications to return to  or seek further evaluation in ED, and the need for timely follow up as directed.   They were provided with a work/school excuse if requested. The after-visit summary was given to the patient and care instructions were reviewed with the patient. All questions were answered and the patient verbalized understanding of the plan of care for today.  Plan:  Recent visit notes reviewed  Meds as above  Increase clear fluids  Pcp follow up this week if not improving or worsening  ER visit anytime 24/7 for acute worsening or changing condition      Orders and Diagnoses  Diagnoses and all orders for this visit:  Non-recurrent acute suppurative otitis media of right ear without spontaneous rupture of tympanic membrane  -     amoxicillin-pot clavulanate (Augmentin) 875-125 mg tablet; Take 1 tablet (875 mg) by mouth 2 times a day.  -     POCT rapid strep A manually resulted  Other orders  -     Follow Up In Primary Care;  Future      Medical Admin Record      Patient disposition: Home    Electronically signed by CATHERINE George  11:44 AM

## 2025-04-02 RX ORDER — CEFAZOLIN SODIUM 2 G/100ML
2 INJECTION, SOLUTION INTRAVENOUS ONCE
OUTPATIENT
Start: 2025-04-02 | End: 2025-04-02

## 2025-04-02 NOTE — H&P
History Of Present Illness  Brendan Floyd is a 40 y.o. male presenting with left knee medial pain and MMT.     Past Medical History  He has no past medical history on file.    Surgical History  He has no past surgical history on file.     Social History  He reports that he has never smoked. He has never used smokeless tobacco. He reports current alcohol use of about 10.0 standard drinks of alcohol per week. He reports that he does not use drugs.    Family History  Family History   Problem Relation Name Age of Onset    Hyperlipidemia Father          Allergies  Patient has no known allergies.    Review of Systems  CONSTITUTIONAL: Denies weight loss, fever and chills.    - HEENT: Denies changes in vision and hearing.    - RESPIRATORY: Denies SOB and cough.    - CV: Denies palpitations and CP.    - GI: Denies abdominal pain, nausea, vomiting and diarrhea.    - : Denies dysuria and urinary frequency.    - MSK: See physical exam findings     - SKIN: Denies rash and pruritus.    - NEUROLOGICAL: Denies headache and syncope.    - PSYCHIATRIC: Denies recent changes in mood. Denies anxiety and depression.      Physical Exam    - GENERAL: Alert and oriented x 3. No acute distress. Well-nourished.    - EYES: EOMI. Anicteric.    - HENT: Moist mucous membranes. No scleral icterus. No cervical lymphadenopathy.    - LUNGS: Clear to auscultation bilaterally. No accessory muscle use.    - CARDIOVASCULAR: Regular rate and rhythm. No murmur. No JVD.    - ABDOMEN: Soft, non-tender and non-distended. No palpable masses.    - EXTREMITIES: (+) McMurrays      - NEUROLOGIC: No focal neurological deficits. CN II-XII grossly intact, but not individually tested.    - PSYCHIATRIC: Cooperative. Appropriate mood and affect.   Last Recorded Vitals  There were no vitals taken for this visit.    Relevant Results      Scheduled medications    Continuous medications    PRN medications    No results found for this or any previous visit (from the past 24  hours).    Assessment/Plan   Assessment & Plan  Other tear of medial meniscus, current injury, right knee, initial encounter      Left medial meniscectomy.        I spent 10 minutes in the professional and overall care of this patient.      Oracio Barnett PA-C

## 2025-04-08 ENCOUNTER — ANESTHESIA EVENT (OUTPATIENT)
Dept: OPERATING ROOM | Facility: HOSPITAL | Age: 40
End: 2025-04-08
Payer: OTHER GOVERNMENT

## 2025-04-08 ENCOUNTER — HOSPITAL ENCOUNTER (OUTPATIENT)
Facility: HOSPITAL | Age: 40
Setting detail: OUTPATIENT SURGERY
Discharge: HOME | End: 2025-04-08
Attending: ORTHOPAEDIC SURGERY | Admitting: ORTHOPAEDIC SURGERY
Payer: OTHER GOVERNMENT

## 2025-04-08 ENCOUNTER — HOSPITAL ENCOUNTER (OUTPATIENT)
Dept: CARDIOLOGY | Facility: HOSPITAL | Age: 40
Setting detail: OUTPATIENT SURGERY
Discharge: HOME | End: 2025-04-08
Payer: OTHER GOVERNMENT

## 2025-04-08 ENCOUNTER — ANESTHESIA (OUTPATIENT)
Dept: OPERATING ROOM | Facility: HOSPITAL | Age: 40
End: 2025-04-08
Payer: OTHER GOVERNMENT

## 2025-04-08 ENCOUNTER — PHARMACY VISIT (OUTPATIENT)
Dept: PHARMACY | Facility: CLINIC | Age: 40
End: 2025-04-08
Payer: COMMERCIAL

## 2025-04-08 VITALS
HEART RATE: 64 BPM | WEIGHT: 167 LBS | RESPIRATION RATE: 18 BRPM | DIASTOLIC BLOOD PRESSURE: 78 MMHG | OXYGEN SATURATION: 96 % | HEIGHT: 68 IN | SYSTOLIC BLOOD PRESSURE: 141 MMHG | TEMPERATURE: 97.2 F | BODY MASS INDEX: 25.31 KG/M2

## 2025-04-08 DIAGNOSIS — G89.18 POST-OPERATIVE PAIN: Primary | ICD-10-CM

## 2025-04-08 DIAGNOSIS — S83.241A OTHER TEAR OF MEDIAL MENISCUS, CURRENT INJURY, RIGHT KNEE, INITIAL ENCOUNTER: ICD-10-CM

## 2025-04-08 LAB
ATRIAL RATE: 61 BPM
P AXIS: 47 DEGREES
P OFFSET: 190 MS
P ONSET: 129 MS
PR INTERVAL: 180 MS
Q ONSET: 219 MS
QRS COUNT: 10 BEATS
QRS DURATION: 102 MS
QT INTERVAL: 442 MS
QTC CALCULATION(BAZETT): 444 MS
QTC FREDERICIA: 444 MS
R AXIS: 4 DEGREES
T AXIS: 29 DEGREES
T OFFSET: 440 MS
VENTRICULAR RATE: 61 BPM

## 2025-04-08 PROCEDURE — 2500000001 HC RX 250 WO HCPCS SELF ADMINISTERED DRUGS (ALT 637 FOR MEDICARE OP): Performed by: ANESTHESIOLOGY

## 2025-04-08 PROCEDURE — 2500000004 HC RX 250 GENERAL PHARMACY W/ HCPCS (ALT 636 FOR OP/ED): Performed by: ORTHOPAEDIC SURGERY

## 2025-04-08 PROCEDURE — 2720000007 HC OR 272 NO HCPCS: Performed by: ORTHOPAEDIC SURGERY

## 2025-04-08 PROCEDURE — RXMED WILLOW AMBULATORY MEDICATION CHARGE

## 2025-04-08 PROCEDURE — 7100000002 HC RECOVERY ROOM TIME - EACH INCREMENTAL 1 MINUTE: Performed by: ORTHOPAEDIC SURGERY

## 2025-04-08 PROCEDURE — 3600000009 HC OR TIME - EACH INCREMENTAL 1 MINUTE - PROCEDURE LEVEL FOUR: Performed by: ORTHOPAEDIC SURGERY

## 2025-04-08 PROCEDURE — 7100000010 HC PHASE TWO TIME - EACH INCREMENTAL 1 MINUTE: Performed by: ORTHOPAEDIC SURGERY

## 2025-04-08 PROCEDURE — 7100000001 HC RECOVERY ROOM TIME - INITIAL BASE CHARGE: Performed by: ORTHOPAEDIC SURGERY

## 2025-04-08 PROCEDURE — A29881 PR KNEE SCOPE,MED/LAT MENISECTOMY: Performed by: ANESTHESIOLOGIST ASSISTANT

## 2025-04-08 PROCEDURE — A29881 PR KNEE SCOPE,MED/LAT MENISECTOMY: Performed by: ANESTHESIOLOGY

## 2025-04-08 PROCEDURE — 3700000002 HC GENERAL ANESTHESIA TIME - EACH INCREMENTAL 1 MINUTE: Performed by: ORTHOPAEDIC SURGERY

## 2025-04-08 PROCEDURE — 3700000001 HC GENERAL ANESTHESIA TIME - INITIAL BASE CHARGE: Performed by: ORTHOPAEDIC SURGERY

## 2025-04-08 PROCEDURE — 7100000009 HC PHASE TWO TIME - INITIAL BASE CHARGE: Performed by: ORTHOPAEDIC SURGERY

## 2025-04-08 PROCEDURE — 93005 ELECTROCARDIOGRAM TRACING: CPT | Mod: 59

## 2025-04-08 PROCEDURE — 3600000004 HC OR TIME - INITIAL BASE CHARGE - PROCEDURE LEVEL FOUR: Performed by: ORTHOPAEDIC SURGERY

## 2025-04-08 PROCEDURE — 2500000004 HC RX 250 GENERAL PHARMACY W/ HCPCS (ALT 636 FOR OP/ED): Performed by: ANESTHESIOLOGIST ASSISTANT

## 2025-04-08 PROCEDURE — 2500000004 HC RX 250 GENERAL PHARMACY W/ HCPCS (ALT 636 FOR OP/ED): Performed by: STUDENT IN AN ORGANIZED HEALTH CARE EDUCATION/TRAINING PROGRAM

## 2025-04-08 RX ORDER — PROPOFOL 10 MG/ML
INJECTION, EMULSION INTRAVENOUS AS NEEDED
Status: DISCONTINUED | OUTPATIENT
Start: 2025-04-08 | End: 2025-04-08

## 2025-04-08 RX ORDER — SODIUM CHLORIDE, SODIUM LACTATE, POTASSIUM CHLORIDE, CALCIUM CHLORIDE 600; 310; 30; 20 MG/100ML; MG/100ML; MG/100ML; MG/100ML
100 INJECTION, SOLUTION INTRAVENOUS CONTINUOUS
Status: DISCONTINUED | OUTPATIENT
Start: 2025-04-08 | End: 2025-04-08 | Stop reason: HOSPADM

## 2025-04-08 RX ORDER — ALBUTEROL SULFATE 0.83 MG/ML
2.5 SOLUTION RESPIRATORY (INHALATION)
Status: DISCONTINUED | OUTPATIENT
Start: 2025-04-08 | End: 2025-04-08 | Stop reason: HOSPADM

## 2025-04-08 RX ORDER — HYDROCODONE BITARTRATE AND ACETAMINOPHEN 5; 325 MG/1; MG/1
1 TABLET ORAL EVERY 4 HOURS PRN
Status: DISCONTINUED | OUTPATIENT
Start: 2025-04-08 | End: 2025-04-08 | Stop reason: HOSPADM

## 2025-04-08 RX ORDER — LABETALOL HYDROCHLORIDE 5 MG/ML
5 INJECTION, SOLUTION INTRAVENOUS
Status: DISCONTINUED | OUTPATIENT
Start: 2025-04-08 | End: 2025-04-08 | Stop reason: HOSPADM

## 2025-04-08 RX ORDER — HYDROCODONE BITARTRATE AND ACETAMINOPHEN 5; 325 MG/1; MG/1
1 TABLET ORAL EVERY 6 HOURS PRN
Qty: 12 TABLET | Refills: 0 | Status: SHIPPED | OUTPATIENT
Start: 2025-04-08 | End: 2025-04-13

## 2025-04-08 RX ORDER — MIDAZOLAM HYDROCHLORIDE 1 MG/ML
INJECTION, SOLUTION INTRAMUSCULAR; INTRAVENOUS AS NEEDED
Status: DISCONTINUED | OUTPATIENT
Start: 2025-04-08 | End: 2025-04-08

## 2025-04-08 RX ORDER — METOCLOPRAMIDE HYDROCHLORIDE 5 MG/ML
10 INJECTION INTRAMUSCULAR; INTRAVENOUS ONCE AS NEEDED
Status: DISCONTINUED | OUTPATIENT
Start: 2025-04-08 | End: 2025-04-08 | Stop reason: HOSPADM

## 2025-04-08 RX ORDER — DIPHENHYDRAMINE HYDROCHLORIDE 50 MG/ML
12.5 INJECTION, SOLUTION INTRAMUSCULAR; INTRAVENOUS ONCE AS NEEDED
Status: DISCONTINUED | OUTPATIENT
Start: 2025-04-08 | End: 2025-04-08 | Stop reason: HOSPADM

## 2025-04-08 RX ORDER — BUPIVACAINE HYDROCHLORIDE 5 MG/ML
INJECTION, SOLUTION PERINEURAL AS NEEDED
Status: DISCONTINUED | OUTPATIENT
Start: 2025-04-08 | End: 2025-04-08 | Stop reason: HOSPADM

## 2025-04-08 RX ORDER — LIDOCAINE HYDROCHLORIDE 20 MG/ML
INJECTION, SOLUTION EPIDURAL; INFILTRATION; INTRACAUDAL; PERINEURAL AS NEEDED
Status: DISCONTINUED | OUTPATIENT
Start: 2025-04-08 | End: 2025-04-08

## 2025-04-08 RX ORDER — CEFAZOLIN SODIUM 2 G/100ML
2 INJECTION, SOLUTION INTRAVENOUS ONCE
Status: COMPLETED | OUTPATIENT
Start: 2025-04-08 | End: 2025-04-08

## 2025-04-08 RX ORDER — ONDANSETRON HYDROCHLORIDE 2 MG/ML
INJECTION, SOLUTION INTRAVENOUS AS NEEDED
Status: DISCONTINUED | OUTPATIENT
Start: 2025-04-08 | End: 2025-04-08

## 2025-04-08 RX ORDER — HYDROMORPHONE HYDROCHLORIDE 1 MG/ML
1 INJECTION, SOLUTION INTRAMUSCULAR; INTRAVENOUS; SUBCUTANEOUS EVERY 5 MIN PRN
Status: DISCONTINUED | OUTPATIENT
Start: 2025-04-08 | End: 2025-04-08 | Stop reason: HOSPADM

## 2025-04-08 RX ORDER — HYDRALAZINE HYDROCHLORIDE 20 MG/ML
5 INJECTION INTRAMUSCULAR; INTRAVENOUS EVERY 30 MIN PRN
Status: DISCONTINUED | OUTPATIENT
Start: 2025-04-08 | End: 2025-04-08 | Stop reason: HOSPADM

## 2025-04-08 RX ORDER — ASPIRIN 81 MG/1
81 TABLET ORAL 2 TIMES DAILY
Qty: 28 TABLET | Refills: 0 | Status: SHIPPED | OUTPATIENT
Start: 2025-04-08 | End: 2025-04-22

## 2025-04-08 RX ORDER — FENTANYL CITRATE 50 UG/ML
INJECTION, SOLUTION INTRAMUSCULAR; INTRAVENOUS AS NEEDED
Status: DISCONTINUED | OUTPATIENT
Start: 2025-04-08 | End: 2025-04-08

## 2025-04-08 RX ADMIN — FENTANYL CITRATE 50 MCG: 50 INJECTION, SOLUTION INTRAMUSCULAR; INTRAVENOUS at 10:08

## 2025-04-08 RX ADMIN — MIDAZOLAM 2 MG: 1 INJECTION INTRAMUSCULAR; INTRAVENOUS at 10:03

## 2025-04-08 RX ADMIN — LIDOCAINE HYDROCHLORIDE 100 MG: 20 INJECTION, SOLUTION EPIDURAL; INFILTRATION; INTRACAUDAL; PERINEURAL at 10:08

## 2025-04-08 RX ADMIN — HYDROCODONE BITARTRATE AND ACETAMINOPHEN 1 TABLET: 5; 325 TABLET ORAL at 12:15

## 2025-04-08 RX ADMIN — PROPOFOL 200 MG: 10 INJECTION, EMULSION INTRAVENOUS at 10:08

## 2025-04-08 RX ADMIN — SODIUM CHLORIDE, POTASSIUM CHLORIDE, SODIUM LACTATE AND CALCIUM CHLORIDE: 600; 310; 30; 20 INJECTION, SOLUTION INTRAVENOUS at 10:00

## 2025-04-08 RX ADMIN — DEXAMETHASONE SODIUM PHOSPHATE 4 MG: 4 INJECTION, SOLUTION INTRAMUSCULAR; INTRAVENOUS at 10:15

## 2025-04-08 RX ADMIN — ONDANSETRON 4 MG: 2 INJECTION INTRAMUSCULAR; INTRAVENOUS at 10:15

## 2025-04-08 RX ADMIN — FENTANYL CITRATE 50 MCG: 50 INJECTION, SOLUTION INTRAMUSCULAR; INTRAVENOUS at 10:27

## 2025-04-08 RX ADMIN — CEFAZOLIN SODIUM 2 G: 2 INJECTION, SOLUTION INTRAVENOUS at 10:15

## 2025-04-08 SDOH — HEALTH STABILITY: MENTAL HEALTH: CURRENT SMOKER: 0

## 2025-04-08 ASSESSMENT — PAIN SCALES - GENERAL
PAINLEVEL_OUTOF10: 1
PAINLEVEL_OUTOF10: 3
PAINLEVEL_OUTOF10: 4
PAINLEVEL_OUTOF10: 0 - NO PAIN
PAINLEVEL_OUTOF10: 0 - NO PAIN
PAINLEVEL_OUTOF10: 4
PAINLEVEL_OUTOF10: 5 - MODERATE PAIN
PAIN_LEVEL: 0

## 2025-04-08 ASSESSMENT — PAIN - FUNCTIONAL ASSESSMENT
PAIN_FUNCTIONAL_ASSESSMENT: 0-10
PAIN_FUNCTIONAL_ASSESSMENT: CPOT (CRITICAL CARE PAIN OBSERVATION TOOL)

## 2025-04-08 ASSESSMENT — PAIN DESCRIPTION - DESCRIPTORS
DESCRIPTORS: SORE
DESCRIPTORS: DULL

## 2025-04-08 ASSESSMENT — COLUMBIA-SUICIDE SEVERITY RATING SCALE - C-SSRS
2. HAVE YOU ACTUALLY HAD ANY THOUGHTS OF KILLING YOURSELF?: NO
1. IN THE PAST MONTH, HAVE YOU WISHED YOU WERE DEAD OR WISHED YOU COULD GO TO SLEEP AND NOT WAKE UP?: NO
6. HAVE YOU EVER DONE ANYTHING, STARTED TO DO ANYTHING, OR PREPARED TO DO ANYTHING TO END YOUR LIFE?: NO

## 2025-04-08 NOTE — ANESTHESIA PREPROCEDURE EVALUATION
"Patient: Spencer Floyd \"Brendan\"    Procedure Information       Anesthesia Start Date/Time: 04/08/25 1000    Procedure: LEFT KNEE ARTHROSCOPIC MEDIAL MENISCECTOMY (Left: Knee)    Location: [facility] 06 / [facility]    Surgeons: Bassam Hargrove MD            Relevant Problems   Cardiac   (+) Atypical chest pain   (+) Familial hyperlipidemia   (+) Heterozygous familial hypercholesterolemia   (+) Hyperlipidemia   (+) Other chest pain       Clinical information reviewed:   Tobacco  Allergies  Meds   Med Hx  Surg Hx   Fam Hx  Soc Hx        NPO Detail:  NPO/Void Status  Carbohydrate Drink Given Prior to Surgery? : N  Date of Last Liquid: 04/07/25  Time of Last Liquid: 2200  Date of Last Solid: 04/08/25  Time of Last Solid: 1900  Last Intake Type: Clear fluids  Time of Last Void: 0930         Physical Exam    Airway  Mallampati: I  TM distance: >3 FB  Neck ROM: full     Cardiovascular   Rhythm: regular  Rate: normal     Dental - normal exam     Pulmonary   Breath sounds clear to auscultation     Abdominal - normal exam             Anesthesia Plan    History of general anesthesia?: yes  History of complications of general anesthesia?: no    ASA 1     general     The patient is not a current smoker.    intravenous induction   Anesthetic plan and risks discussed with patient.    Plan discussed with CAA.      "

## 2025-04-08 NOTE — OP NOTE
"Operative Note     Date: 2025  OR Location: [facility]    Name: Spencer Floyd \"Brendan\", : 1985, Age: 40 y.o., MRN: [id number], Sex: male    Diagnosis  Pre-op Diagnosis      * Other tear of medial meniscus, current injury, right knee, initial encounter [S83.241A]  Chondral defect lateral trochlea  Post-op Diagnosis     * Other tear of medial meniscus, current injury, right knee, initial encounter [S83.241A]  Chondral defect lateral trochlea      Procedures  LEFT KNEE ARTHROSCOPIC MEDIAL MENISCECTOMY  63978 - NC ARTHRS KNE SURG W/MENISCECTOMY MED/LAT W/SHVG  WITH CHONDROPLASTY TROCHLEA     Surgeons      * Bassam Hargrove - Primary    Resident/Fellow/Other Assistant:  Oracio Barnett PA-C     Staff:   Surgical Assistant:   Tiulator: Dewayne Martinez Person: Ernestina    Anesthesia Staff: Anesthesiologist: Ronnie Loza MD  C-AA: ROBERT Rojas    Procedure Summary  Anesthesia: General  ASA: I  Estimated Blood Loss: 5 mL  Intra-op Medications:   Administrations occurring from 1020 to 1135 on 25:   Medication Name Total Dose   BUPivacaine HCl (Marcaine) 0.5 % (5 mg/mL) injection 10 mL   fentaNYL (Sublimaze) injection 50 mcg/mL 50 mcg   LR bolus Cannot be calculated              Anesthesia Record               Intraprocedure I/O Totals          Intake    LR bolus 600.00 mL    Total Intake 600 mL          Specimen: No specimens collected                Implants:     Findings:   Operative findings: (Outerbridge classification 0-4)   Patella: 0  Trochlea: fissure with delamination lateral trochlea (0.7 cm med/lat, 0.9 sup/inf)  Medial compartment: 0  Lateral compartment: 0    Indications:     The patient was seen in the preoperative area. The risks, benefits, complications, treatment options, non-operative alternatives, expected recovery and outcomes were discussed with the patient. The possibilities of reaction to medication, pulmonary aspiration, injury to surrounding structures, bleeding, recurrent " infection, the need for additional procedures, failure to diagnose a condition, and creating a complication requiring transfusion or operation were discussed with the patient. The patient concurred with the proposed plan, giving informed consent.  The site of surgery was properly noted/marked if necessary per policy. The patient has been actively warmed in preoperative area. Preoperative antibiotics ordered and given within 1 hours of incision. Venous thrombosis prophylaxis have been ordered.     Patient was noted to have internal derangement of the knee refractory to non-surgical modalities.  We discussed associated interventions and the risks of the surgery, including DVT/PE, infection, stiffness, medical complications, and incomplete relief of pre-operative symptoms.    Procedure Details:   The patient was met prior to surgery and the appropriate extremity was marked.  We reviewed recent health history and found no contraindication to proceeding with the planned procedure.  The patient was transported to the operating room and underwent general anesthesia.  The patient was positioned supine on the operative table with all nancy prominences well-padded. A sequential compression device was placed on the contralateral leg.  A non-sterile thigh tourniquet was placed and a padded lateral thigh post.    The leg was prepped and draped in the usual sterile fashion.  A timeout was completed and antibiotic administration was in accordance with standard protocol.  The leg was exsanguinated using an Esmarch bandage and the tourniquet was inflated.  The superficial landmarks of the knee were palpated and anteromedial and anterolateral portals were created.    A diagnostic arthroscopy was performed utilizing a fluid pump with sterile saline.  The articular surface of the patella, trochlea, medial and lateral femoral condyles and tibial plateaus were evaluated.  The Outerbridge classifications are listed above.  The gutters  were evaluated and no loose bodies were noted. The medial compartment was entered with valgus stress in a slightly flexed knee against the lateral post.  The assistant helped with this to facilitate visualization.  The meniscus was probed superiorly and inferiorly using a blunt probe.  The notch was inspected and the ACL and PCL were healthy in appearance and had appropriate tension when probed.  The knee was then flexed into a figure of four position and the lateral compartment was entered.      The articular surface of the trochlea had chondral wear and a component of delamination which we felt could contribute to mechanical symptoms.  A 3.5 mm aggressive plus shaver was used to debride the articular cartilage until stable margins were obtained.  A curette was also used to delineate margins.    The medial meniscus was noted to have a small tear at the root.   Using an aggressive plus shaver and meniscal biters, we debrided the meniscus back until we obtained healthy and stable margins.  The meniscectomy extended about 10 % to the periphery in the affected area.    The patella did track over the trochlear defect with ROM and visualization.    The knee was irrigated and lavaged to remove and loose meniscal or chondral debris.  A second pass was made diagnostically to confirm removal of any fragments and inspect for any additional pathology.  The residual fluid was expressed from the knee.  The portals were closed using a buried 3-0 monocryl suture.  Local anesthetic was injected into the soft tissue around the portals. Sterile bandages were placed.  The patient was stable to the recovery room.    I was present and participated in the entire procedure. The Physician Assistant participated in all critical elements of the procedure under my direct supervision. The surgical incision was closed by the PA under my indirect supervision. There were no qualified residents available to assist.    The physician assistant was  present for the entire case.  Given the nature of the procedure and disease process, a skilled surgical assistant was necessary for the case.  The assistant was necessary for retraction and helped directly facilitate completion of the surgery.  A certified scrub tech was at the back table managing instruments and supplies for the surgical procedure.      Complications:  None; patient tolerated the procedure well.    Disposition: PACU - hemodynamically stable.  Condition: stable         Bassam Hargrove  Phone Number: [phone]

## 2025-04-08 NOTE — DISCHARGE INSTRUCTIONS
Post-Operative Instructions - Basic Knee Arthroscopy    Dr. Bassam Hargrove    Activity / Swelling:  Okay to weightbear as tolerated immediately.  A brace is NOT needed. Crutches are used for balance and support but are only needed until patient feels safe ambulating.  ICE PACK to assist with pain control (should not be applied directly to skin)  Use fairly continuously until you remove the post-op dressing, after dressing removed, use for up to 20 minutes every hour as needed for pain and swelling     Diet:  Gradually resume your normal diet as tolerated following surgery.  The evening of your surgical day, begin with liquids and/or light foods.  If you are feeling well enough the next morning, progress to your normal eating patterns    Dressing care /Showering / Hygiene:  Keep operative dressing clean, dry, and intact.   Sutures are generally absorbable and do not need to be removed.    Remove dressing on Post-Op Day# 3    Leave the Steri-strips (small white tapes across the incisions) in place.  If no Steri-strips or they come off with dressing cover incisions with a regular / large band aid.  Do not apply lotions or ointments to incisions.  Observe the incision sites for increased redness, drainage, or foul odor.     Showering:  Once the dressing has been removed, you may shower. Incisions may be gently cleansed with mild soap. Do not scrub or rub incisions. No baths, hot-tubs, pools, or immersive soaking of any kind until incisions are healed.      Medications:  Pain:  You will be given a prescription for pain medication after your surgery.  It should be taken as needed only and in many cases is NOT needed.  The goal is to discontinue it as quickly as possible.  DO NOT drive or operate heavy machinery while taking this medication as it will make you drowsy.  Do not take any additional pain medications.  This medication often continues Tylenol / acetaminophen and NO additional acetaminophen should be  taken.    You may want to consider also taking over-the-counter stool softener and/or laxative (Colace, Senekot or Dulcolax). These medications should be taken as directed and only short term.    In addition to pain medication recommend over the counter Ibuprofen 600 mg every 6-8 hours (If tolerated prior to surgery).  Take with food and and can also take an over the counter medicine (Pepcid/omeprazole) to help protect the stomach.  Do not take the ibuprofen if prior bleeding issues or history of ulcers.     Prevention of Blood Clots:  81 mg of aspirin (over the counter) twice a day to start the day after surgery for 2 weeks.   Calf pumps should be done at rest.  If you have a history of blood clots you may receive a different prescription (for example: lovenox, xarelto, eliquis)     If already on blood thinners can resume POD #1 and will not take aspirin.    Physical therapy:         An order was placed for physical therapy.  It is not mandatory but may help expedite recovery can start immediately postop based on availability    Home therapy can begin the day after surgery ~ 4 times a day for 20 min:  -  Prop up the heel and working on gently pushing down on thigh to promote a STRAIGHT LEG.    -  Quad sets: with leg straight tighten quad muscles and hold for 5 seconds.    -   Bending can be limited by swelling but okay to bend the leg immediately and as much as tolerated.    Driving: once off pain medications & crutches, and good leg control is achieved.  Will be discussed at first visit.    Follow-up:         Generally 10-14 days post-op      Call with any concerns, especially calf pain, signs of infection (fever, drainage) or shortness of breath.  [phone]

## 2025-04-08 NOTE — ANESTHESIA POSTPROCEDURE EVALUATION
"Patient: Spencer Floyd \"Brendan\"    Procedure Summary       Date: 04/08/25 Room / Location: [facility] OR 06 / [facility]    Anesthesia Start: 1000 Anesthesia Stop: 1105    Procedure: LEFT KNEE ARTHROSCOPIC MEDIAL MENISCECTOMY (Left: Knee) Diagnosis:       Other tear of medial meniscus, current injury, right knee, initial encounter      (Other tear of medial meniscus, current injury, right knee, initial encounter [S83.931A])    Surgeons: Bassam Hargrove MD Responsible Provider: Ronnie Loza MD    Anesthesia Type: general ASA Status: 1            Anesthesia Type: general    Vitals Value Taken Time   /69 04/08/25 1106   Temp 36.5 04/08/25 1106   Pulse 50 04/08/25 1106   Resp 14 04/08/25 1106   SpO2 96 04/08/25 1106       Anesthesia Post Evaluation    Patient location during evaluation: PACU  Patient participation: complete - patient cannot participate  Level of consciousness: sedated  Pain score: 0  Pain management: adequate  Airway patency: patent  Cardiovascular status: acceptable, bradycardic, hemodynamically stable and stable  Respiratory status: acceptable, nonlabored ventilation, room air, spontaneous ventilation and unassisted  Hydration status: acceptable  Postoperative Nausea and Vomiting: none        There were no known notable events for this encounter.    "

## 2025-04-08 NOTE — ANESTHESIA PROCEDURE NOTES
Airway  Date/Time: 4/8/2025 10:10 AM  Urgency: elective    Airway not difficult    Staffing  Performed: ROBERT   Authorized by: Ronnie Loza MD    Performed by: ROBERT Rojas  Patient location during procedure: OR    Indications and Patient Condition  Indications for airway management: anesthesia and airway protection  Spontaneous Ventilation: absent  Sedation level: deep  Preoxygenated: yes  Patient position: sniffing  Mask difficulty assessment: 1 - vent by mask  Planned trial extubation    Final Airway Details  Final airway type: supraglottic airway      Successful airway: unique  Size 4     Number of attempts at approach: 1  Ventilation between attempts: none  Number of other approaches attempted: 0

## 2025-04-09 PROBLEM — M25.562 CHRONIC PAIN OF LEFT KNEE: Status: ACTIVE | Noted: 2025-04-09

## 2025-04-09 PROBLEM — G89.29 CHRONIC PAIN OF LEFT KNEE: Status: ACTIVE | Noted: 2025-04-09

## 2025-04-09 ASSESSMENT — ENCOUNTER SYMPTOMS
COUGH: 0
LIGHT-HEADEDNESS: 0
NAUSEA: 0
ABDOMINAL PAIN: 0
ARTHRALGIAS: 1
SHORTNESS OF BREATH: 0
DIZZINESS: 0
FEVER: 0
CHILLS: 0
VOMITING: 0

## 2025-04-28 ENCOUNTER — OFFICE VISIT (OUTPATIENT)
Dept: ORTHOPEDIC SURGERY | Facility: CLINIC | Age: 40
End: 2025-04-28
Payer: OTHER GOVERNMENT

## 2025-04-28 DIAGNOSIS — G89.18 POST-OP PAIN: Primary | ICD-10-CM

## 2025-04-28 PROCEDURE — 99211 OFF/OP EST MAY X REQ PHY/QHP: CPT | Performed by: STUDENT IN AN ORGANIZED HEALTH CARE EDUCATION/TRAINING PROGRAM

## 2025-04-28 PROCEDURE — 1036F TOBACCO NON-USER: CPT | Performed by: STUDENT IN AN ORGANIZED HEALTH CARE EDUCATION/TRAINING PROGRAM

## 2025-04-28 PROCEDURE — 99024 POSTOP FOLLOW-UP VISIT: CPT | Performed by: STUDENT IN AN ORGANIZED HEALTH CARE EDUCATION/TRAINING PROGRAM

## 2025-04-28 NOTE — PROGRESS NOTES
History of Present Illness:  Patient returns today noting minimal pain.  Denies any calf pain or shortness of breath.  Overall notes he is doing very well, has lost a little bit of motion to deep flexion but overall is improving.    Physical Examination:   Mild effusion  Healthy incisions - no active drainage  Good range of motion within a few degrees of contralateral side.  No calf swelling  Negative Bautista´s test  Distal neurovascular exam intact    Operative Findings:  Patella: 0  Trochlea: fissure with delamination lateral trochlea (0.7 cm med/lat, 0.9 sup/inf)  Medial compartment: 0  Lateral compartment: 0    Assessment:  Patient status post left knee arthroscopy partial medial meniscectomy with chondroplasty of the trochlea    Plan:  Reviewed arthroscopic photos and findings.  Discussed short and long term implications for the knee.  Discussed analgesics, ice, rest.  Encouraged home exercise program, physical therapy.     Oracio Barnett PA-C        
Never

## 2025-09-01 DIAGNOSIS — E78.01 HETEROZYGOUS FAMILIAL HYPERCHOLESTEROLEMIA: ICD-10-CM

## 2025-09-02 ENCOUNTER — PATIENT MESSAGE (OUTPATIENT)
Dept: CARDIOLOGY | Facility: CLINIC | Age: 40
End: 2025-09-02
Payer: OTHER GOVERNMENT

## 2025-09-02 DIAGNOSIS — E78.01 HETEROZYGOUS FAMILIAL HYPERCHOLESTEROLEMIA: ICD-10-CM

## 2025-09-02 RX ORDER — EVOLOCUMAB 140 MG/ML
INJECTION, SOLUTION SUBCUTANEOUS
Refills: 44 | OUTPATIENT
Start: 2025-09-02 | End: 2025-12-01

## 2025-09-04 RX ORDER — EVOLOCUMAB 140 MG/ML
140 INJECTION, SOLUTION SUBCUTANEOUS
Qty: 6 ML | Refills: 3 | Status: SHIPPED | OUTPATIENT
Start: 2025-09-04 | End: 2026-09-04

## (undated) DEVICE — PADDING, WEBRIL, UNDERCAST, STERILE, 6 IN

## (undated) DEVICE — GLOVE, SURGICAL, PROTEXIS PI , 7.5, PF, LF

## (undated) DEVICE — NEEDLE, HYPODERMIC, SPECIALTY, REGULAR WALL, SHORT BEVEL, 18 G X 1.5 IN

## (undated) DEVICE — Device

## (undated) DEVICE — GLOVE, SURGICAL, PROTEXIS PI BLUE W/NEUTHERA, 7.5, PF, LF

## (undated) DEVICE — TOWEL PACK, STERILE, 4/PACK, BLUE

## (undated) DEVICE — BANDAGE, COFLEX, 6 X 5 YDS, FOAM TAN, STERILE, LF

## (undated) DEVICE — BANDAGE, ELASTIC, MATRIX, SELF-CLOSURE, 6 IN X 5 YD, LF

## (undated) DEVICE — STRIP, SKIN CLOSURE, STERI STRIP, REINFORCED, 0.5 X 4 IN

## (undated) DEVICE — DRESSING, ABDOMINAL, WET PRUF, TENDERSORB, 5 X 9 IN, STERILE

## (undated) DEVICE — TUBING, PATIENT 8FT STERILE

## (undated) DEVICE — SUTURE, MONOCRYL, 3-0, 27 IN, PS-2, UNDYED

## (undated) DEVICE — DRESSING, GAUZE, PETROLATUM, PATCH, XEROFORM, 1 X 8 IN, STERILE

## (undated) DEVICE — APPLICATOR, CHLORAPREP, W/ORANGE TINT, 26ML

## (undated) DEVICE — BLADE, STRYKER, 4.0MM, AGG PLUS SHVBLD ULTMT

## (undated) DEVICE — BANDAGE, ESMARK, 6 IN X 12 FT

## (undated) DEVICE — DRESSING, GAUZE, SUPER KERLIX, 6X6